# Patient Record
Sex: FEMALE | Race: WHITE
[De-identification: names, ages, dates, MRNs, and addresses within clinical notes are randomized per-mention and may not be internally consistent; named-entity substitution may affect disease eponyms.]

---

## 2021-04-11 ENCOUNTER — HOSPITAL ENCOUNTER (INPATIENT)
Dept: HOSPITAL 53 - M ED | Age: 21
LOS: 8 days | Discharge: HOME | DRG: 753 | End: 2021-04-19
Attending: PSYCHIATRY & NEUROLOGY | Admitting: PSYCHIATRY & NEUROLOGY
Payer: COMMERCIAL

## 2021-04-11 VITALS — WEIGHT: 123.46 LBS | BODY MASS INDEX: 21.6 KG/M2 | HEIGHT: 63.5 IN

## 2021-04-11 DIAGNOSIS — F31.81: Primary | ICD-10-CM

## 2021-04-11 DIAGNOSIS — Z56.89: ICD-10-CM

## 2021-04-11 DIAGNOSIS — Z63.5: ICD-10-CM

## 2021-04-11 DIAGNOSIS — F41.0: ICD-10-CM

## 2021-04-11 DIAGNOSIS — R45.851: ICD-10-CM

## 2021-04-11 DIAGNOSIS — F17.200: ICD-10-CM

## 2021-04-11 DIAGNOSIS — Z79.899: ICD-10-CM

## 2021-04-11 SDOH — SOCIAL STABILITY - SOCIAL INSECURITY: DISRUPTION OF FAMILY BY SEPARATION AND DIVORCE: Z63.5

## 2021-04-12 VITALS — DIASTOLIC BLOOD PRESSURE: 67 MMHG | SYSTOLIC BLOOD PRESSURE: 115 MMHG

## 2021-04-12 RX ADMIN — SERTRALINE HYDROCHLORIDE SCH MG: 50 TABLET ORAL at 21:58

## 2021-04-13 VITALS — DIASTOLIC BLOOD PRESSURE: 67 MMHG | SYSTOLIC BLOOD PRESSURE: 118 MMHG

## 2021-04-13 VITALS — SYSTOLIC BLOOD PRESSURE: 104 MMHG | DIASTOLIC BLOOD PRESSURE: 59 MMHG

## 2021-04-13 RX ADMIN — LAMOTRIGINE SCH MG: 25 TABLET ORAL at 20:40

## 2021-04-13 RX ADMIN — SERTRALINE HYDROCHLORIDE SCH MG: 50 TABLET ORAL at 20:40

## 2021-04-13 RX ADMIN — ACETAMINOPHEN PRN MG: 325 TABLET ORAL at 18:19

## 2021-04-13 NOTE — MHHPE
[facility] HISTORY AND PHYSICAL



DATE OF ADMISSION:  04/12/2021



IDENTIFYING DATA:  She is a 20-year-old  female, single, living by

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 Was admitted because

of suicidal thoughts. 



CHIEF COMPLAINT: "I was overwhelmed with work, and I had suicidal thoughts."



HISTORY OF PRESENT ILLNESS:  Two days ago patient was overwhelmed with work.

She had two panic attacks. Did not want to work. She went to her supervisor and

told her that she wanted to quit the job, and the patient's supervisor inquired

about her suicidal thoughts. She admitted to it, and she was brought to the

hospital. Complains of sleep disturbances, low energy, poor appetite,

hopelessness, helplessness. Though she had suicidal thoughts, she did not have

any plans. Patient has a history of hypomanic episodes. The symptoms were she

was feeling she was on the top of the world, she had racing thoughts, she was

into risk-taking behaviors, like driving fast, oversexuality, and when she is

hyper she goes on road trips for a long distance. Patient also has a history of

panic attacks without agoraphobia. Her current stressors are work. She split

with her boyfriend in January of this month. Her depression started from

January; however, it worsened recently, a few weeks ago. 



PAST PSYCHIATRIC HISTORY: Denied any psychiatric hospitalizations in the past,

but when she was 15 she wrote a suicide note, and she cut herself.



DRUG AND ALCOHOL HISTORY: Patient uses cannabis every day, and she also drinks

alcohol once a month. 



MEDICAL HISTORY:  Denies medical problems.



FAMILY HISTORY: Her father probably has bipolar disorder.  She was raised by

her father and stepmother. She has one brother and three sisters. She completed

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

[employer] for the last 1 year. 



MENTAL STATUS EXAMINATION: Thin built, cooperative. Made good eye contact. Mood

is depressed. Affect is constricted. Speech rate, rhythm, volume are good.

Thought process: Linear, goal directed. Thought content: Currently denies any

suicidal thoughts. Memory, immediate, remote, recent, is good. She is oriented

to time, place, and person. Impulse control is adequate. 



VITAL SIGNS: Temperature 98.4, respiratory rate is 14, pulse is 52, blood

pressure 104/59, pulse oximetry 99. 



CURRENT MEDICATIONS: Sertraline 50 mg once daily 



REVIEW OF SYSTEMS: 

CONSTITUTIONAL: Negative for night sweats, weight loss. Negative for epistaxis,

headache, hearing loss.

RESPIRATORY: No cough. No shortness of breath or wheezing.

CARDIOVASCULAR: Negative for chest pain or dyspnea or palpitations.

GASTROINTESTINAL: No abdominal pain. No change in bowel habits.

GENITOURINARY: No dysuria. No trouble voiding. No hematuria.

NEUROLOGIC: Negative for gait disturbances, numbness, tingling.



DIAGNOSIS:

Mood disorder, not otherwise specified, rule out bipolar 2 disorder.



PLAN: 

1. Admit patient to inpatient mental health unit (IM). 

2. Patient will be followed by internist for medical needs.

3. Patient will be seen by physical therapy (PT), occupational therapy (OT),

and . 

4. Patient will be placed on suicide precautions.

5. Patient will participate in appropriate activities, groups, individual

therapy.



MEDICATIONS: Patient currently is on Zoloft. I will continue 50 mg at night and

continue Lamictal 25 mg at night; titrate the dose upward. 



ESTIMATED LENGTH OF STAY: 4-5 days.



TIME SPENT: 1 hour.

## 2021-04-14 VITALS — DIASTOLIC BLOOD PRESSURE: 71 MMHG | SYSTOLIC BLOOD PRESSURE: 116 MMHG

## 2021-04-14 VITALS — SYSTOLIC BLOOD PRESSURE: 105 MMHG | DIASTOLIC BLOOD PRESSURE: 65 MMHG

## 2021-04-14 RX ADMIN — SERTRALINE HYDROCHLORIDE SCH MG: 50 TABLET ORAL at 20:48

## 2021-04-14 RX ADMIN — ACETAMINOPHEN PRN MG: 325 TABLET ORAL at 09:01

## 2021-04-14 RX ADMIN — LAMOTRIGINE SCH MG: 25 TABLET ORAL at 20:48

## 2021-04-14 NOTE — MHIPN
PROGRESS NOTE



DATE:  04/14/2021



SUBJECTIVE:  I am doing well, I am not depressed, only I had slight headache.  



OBJECTIVE: She is a 20-year-old  female, single living by herself,

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

suicidal thoughts.  The patient was stressed out at work. She told her

supervisor that she would like to quit.  When supervisor inquired further, she

expressed suicidal thoughts and she was brought to the hospital.  This is her

first psychiatric hospitalization.  The patient uses cannabis every day.  



The patient currently is doing better.  However, she has some mood swings. 



MENTAL STATUS EXAMINATION:  Thin built, cooperative, made good eye contact. 

Mood is depressed.  Affect is restricted.  Speech, rate, rhythm, volume are

good. Thought process linear, goal directed.  Denied auditory hallucination.

Denies suicidal or homicidal ideas.  Memory immediate, remote, recent are good.

She is oriented to time, place and person.  



VITAL SIGNS:  Temperature 97.7, pulse is 50, respiratory is 16, blood pressure

is 105/65, pox 99%.  



MEDICATIONS:  Lamictal 25 mg at night, Zoloft 50 mg in the a.m.  



ASSESSMENT AND PLAN:  The patient currently is doing better.  Denies any side

effects from the medications.  Plan is to continue current medications. 

Continue individual group and Milieu therapy.



Estimated length of stay 4 to 5 days.  



Time spent is 25 minutes.

## 2021-04-15 VITALS — SYSTOLIC BLOOD PRESSURE: 142 MMHG | DIASTOLIC BLOOD PRESSURE: 70 MMHG

## 2021-04-15 VITALS — SYSTOLIC BLOOD PRESSURE: 123 MMHG | DIASTOLIC BLOOD PRESSURE: 57 MMHG

## 2021-04-15 RX ADMIN — LAMOTRIGINE SCH MG: 25 TABLET ORAL at 20:47

## 2021-04-15 RX ADMIN — SERTRALINE HYDROCHLORIDE SCH MG: 25 TABLET ORAL at 20:47

## 2021-04-15 NOTE — MHIPNPDOC
[facility] Progress Note


Progress Note


DATE OF SERVICE: 4/15/21





SUBJECTIVE:  I am doing well, I am not depressed, only I had slight headache.  


"I could not sleep well"


OBJECTIVE: She is a 20-year-old  female, single living by herself,


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


suicidal thoughts.  The patient was stressed out at work. She told her


supervisor that she would like to quit.  When supervisor inquired further, she


expressed suicidal thoughts and she was brought to the hospital.  This is her


first psychiatric hospitalization.  The patient uses cannabis every day.  





The patient currently is doing better.  However, she has some mood swings. 


Still somewhat depressed





MENTAL STATUS EXAMINATION:  Thin built, cooperative, made good eye contact. 


Mood is depressed.  Affect is restricted.  Speech, rate, rhythm, volume are


good. Thought process linear, goal directed.  Denied auditory hallucination.


Denies suicidal or homicidal ideas.  Memory immediate, remote, recent are good.


She is oriented to time, place and person.  








 





MEDICATIONS:  Lamictal 25 mg at night, Zoloft 50 mg in the a.m.  





ASSESSMENT AND PLAN:  The patient currently is doing better.  Denies any side


effects from the medications.  Plan is to continue current medications. 


Continue individual group and Milieu therapy.


Increase zoloft 75 mg AM and trazodone 100 mg HS





Estimated length of stay 4 to 5 days.  





Time spent is 25 minutes.  








Vital Signs





Vital Signs








  Date Time  Temp Pulse Resp B/P (MAP) Pulse Ox O2 Delivery O2 Flow Rate FiO2


 


4/15/21 06:27 98.5 48 16 142/70 (94) 97 Room Air  











Current Medications





Current Medications








 Medications


  (Trade)  Dose


 Ordered  Sig/Genesis


 Route


 PRN Reason  Start Time


 Stop Time Status Last Admin


Dose Admin


 


 Acetaminophen


  (Tylenol Tab)  650 mg  Q6HP  PRN


 PO


 HEADACHE or DISCOMFORT  4/12/21 13:50


    4/14/21 09:01





 


 Home Med


  (Med Rec


 Complete!)    ASDIRECTED


 XX


   4/12/21 06:55


 4/12/21 06:54 DC  





 


 Lamotrigine


  (LaMICtal)  25 mg  QHS


 PO


   4/13/21 21:00


    4/14/21 20:48





 


 Sertraline HCl


  (Zoloft)  50 mg  QHS


 PO


   4/12/21 21:00


 4/15/21 10:33 DC 4/14/21 20:48





 


 Sertraline HCl


  (Zoloft)  75 mg  QHS


 PO


   4/15/21 21:00


     





 


 Trazodone HCl


  (Desyrel)  50 mg  QHSP  PRN


 PO


 INSOMNIA  4/12/21 13:50


 4/15/21 10:33 DC 4/14/21 22:44





 


 Trazodone HCl


  (Desyrel)  100 mg  QHSP  PRN


 PO


 INSOMNIA  4/15/21 10:30


     














Allergies


Coded Allergies:  


     No Known Allergies (Unverified , 4/11/21)











YOAN SALCIDO MD          Apr 15, 2021 12:22

## 2021-04-16 VITALS — DIASTOLIC BLOOD PRESSURE: 59 MMHG | SYSTOLIC BLOOD PRESSURE: 116 MMHG

## 2021-04-16 VITALS — DIASTOLIC BLOOD PRESSURE: 77 MMHG | SYSTOLIC BLOOD PRESSURE: 133 MMHG

## 2021-04-16 RX ADMIN — SERTRALINE HYDROCHLORIDE SCH MG: 25 TABLET ORAL at 20:49

## 2021-04-16 RX ADMIN — LAMOTRIGINE SCH MG: 25 TABLET ORAL at 20:49

## 2021-04-16 NOTE — MHIPNPDOC
[facility] Progress Note


Progress Note


DATE OF SERVICE: 4/16/21





SUBJECTIVE:  I am doing well, I am not depressed, only I had slight headache.  


"I could not sleep well"


OBJECTIVE: She is a 20-year-old  female, single living by herself,


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


suicidal thoughts.  The patient was stressed out at work. She told her


supervisor that she would like to quit.  When supervisor inquired further, she


expressed suicidal thoughts and she was brought to the hospital.  This is her


first psychiatric hospitalization.  The patient uses cannabis every day.  





The patient currently is doing better.  However, she has some mood swings. 


and doing better, brighter.





MENTAL STATUS EXAMINATION:  Thin built, cooperative, made good eye contact. 


Mood is depressed.  Affect is restricted.  Speech, rate, rhythm, volume are


good. Thought process linear, goal directed.  Denied auditory hallucination.


Denies suicidal or homicidal ideas.  Memory immediate, remote, recent are good.


She is oriented to time, place and person.  








 





MEDICATIONS:  Lamictal 25 mg at night, Zoloft 50 mg in the a.m.  





ASSESSMENT AND PLAN:  The patient currently is doing better.  Denies any side


effects from the medications.  Plan is to continue current medications. 


Continue individual group and Milieu therapy.


Increase Zoloft 75 mg AM and trazodone 100 mg HS


Increase Lamictal 25 mg bid


Estimated length of stay 4 to 5 days.  





Time spent is 25 minutes.  











Vital Signs





Vital Signs








  Date Time  Temp Pulse Resp B/P (MAP) Pulse Ox O2 Delivery O2 Flow Rate FiO2


 


4/16/21 06:31 97.7 55 16 116/59 (78) 98 Room Air  











Current Medications





Current Medications








 Medications


  (Trade)  Dose


 Ordered  Sig/Genesis


 Route


 PRN Reason  Start Time


 Stop Time Status Last Admin


Dose Admin


 


 Acetaminophen


  (Tylenol Tab)  650 mg  Q6HP  PRN


 PO


 HEADACHE or DISCOMFORT  4/12/21 13:50


    4/14/21 09:01





 


 Home Med


  (Med Rec


 Complete!)    ASDIRECTED


 XX


   4/12/21 06:55


 4/12/21 06:54 DC  





 


 Lamotrigine


  (LaMICtal)  25 mg  QHS


 PO


   4/13/21 21:00


    4/15/21 20:47





 


 Sertraline HCl


  (Zoloft)  50 mg  QHS


 PO


   4/12/21 21:00


 4/15/21 10:33 DC 4/14/21 20:48





 


 Sertraline HCl


  (Zoloft)  75 mg  QHS


 PO


   4/15/21 21:00


    4/15/21 20:47





 


 Trazodone HCl


  (Desyrel)  50 mg  QHSP  PRN


 PO


 INSOMNIA  4/12/21 13:50


 4/15/21 10:33 DC 4/14/21 22:44





 


 Trazodone HCl


  (Desyrel)  100 mg  QHSP  PRN


 PO


 INSOMNIA  4/15/21 10:30


    4/15/21 21:20














Allergies


Coded Allergies:  


     No Known Allergies (Unverified , 4/11/21)











YOAN SALCIDO MD          Apr 16, 2021 14:20

## 2021-04-17 VITALS — SYSTOLIC BLOOD PRESSURE: 118 MMHG | DIASTOLIC BLOOD PRESSURE: 65 MMHG

## 2021-04-17 VITALS — DIASTOLIC BLOOD PRESSURE: 59 MMHG | SYSTOLIC BLOOD PRESSURE: 105 MMHG

## 2021-04-17 VITALS — SYSTOLIC BLOOD PRESSURE: 105 MMHG | DIASTOLIC BLOOD PRESSURE: 59 MMHG

## 2021-04-17 RX ADMIN — LAMOTRIGINE SCH MG: 25 TABLET ORAL at 20:15

## 2021-04-17 RX ADMIN — ACETAMINOPHEN PRN MG: 325 TABLET ORAL at 21:59

## 2021-04-17 RX ADMIN — LAMOTRIGINE SCH MG: 25 TABLET ORAL at 09:28

## 2021-04-17 RX ADMIN — SERTRALINE HYDROCHLORIDE SCH MG: 25 TABLET ORAL at 20:15

## 2021-04-18 VITALS — SYSTOLIC BLOOD PRESSURE: 102 MMHG | DIASTOLIC BLOOD PRESSURE: 51 MMHG

## 2021-04-18 VITALS — DIASTOLIC BLOOD PRESSURE: 67 MMHG | SYSTOLIC BLOOD PRESSURE: 136 MMHG

## 2021-04-18 RX ADMIN — LAMOTRIGINE SCH MG: 25 TABLET ORAL at 08:24

## 2021-04-18 RX ADMIN — SERTRALINE HYDROCHLORIDE SCH MG: 25 TABLET ORAL at 20:19

## 2021-04-18 RX ADMIN — LAMOTRIGINE SCH MG: 25 TABLET ORAL at 20:19

## 2021-04-19 VITALS — DIASTOLIC BLOOD PRESSURE: 53 MMHG | SYSTOLIC BLOOD PRESSURE: 139 MMHG

## 2021-04-19 RX ADMIN — LAMOTRIGINE SCH MG: 25 TABLET ORAL at 08:28

## 2021-04-19 NOTE — MHDS
[facility] DISCHARGE SUMMARY



DATE OF ADMISSION:  04/12/2021

DATE OF DISCHARGE:  04/19/2021



DIAGNOSIS:  Mood disorder not otherwise specified, rule out bipolar 2 disorder.



IDENTIFYING DATA: She is a 20-year-old  female, single, living by

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

admitted because of suicidal thoughts.  She was stressed out at work.  



For details of HPI, past psychiatric history, substance abuse history, medical

history, social history, please refer to the initial evaluation. 



COURSE IN THE HOSPITAL:  The patient initially was depressed. Her affect was

labile. Complained of decreased sleep, poor appetite.  She was placed on

Lamictal and Zoloft.  Her medication was titrated upwards. She made gradual

recovery.  Her depression resolved.  She started sleeping better.  She attended

groups.  She attended activities.  Denied any suicidal thoughts.  Denied any

side effect of the medications. 



MENTAL STATUS EXAMINATION:

Neatly dressed, well-groomed, cooperative, made good eye contact. Speech rate,

rhythm and volume are good.  Thought process linear, goal directed.  Thought

content: Denied any suicidal and homicidal ideas.  Denied any hallucinations,

auditory or visual.  Her insight and judgment are good.  Her memory immediate,

remote and recent are good.



VITAL SIGNS:  Temperature 98.4, respirations 16, pulse 65, blood pressure

139/53, pulse oximetry 96.



PLAN: Plan is to send her home.  She will be staying with her grandmother

initially.  She wants to quit her job and she will be followed up at [facility].



DISCHARGE MEDICATIONS:   

1.  Lamictal 25 mg twice a day. 

2.  Zoloft 25 mg in the a.m. 



Time spent is less than 30 minutes.

## 2023-12-28 NOTE — HPEPDOC
General


Date of Admission


Apr 12, 2021 at 13:49


Date of Service:  Apr 13, 2021


Chief Complaint


The patient is a 20-year-old female admitted with a reason for visit of 

Unspecified Depressive Disorder.


Source:  Patient


Exam Limitations:  No limitations





History of Present Illness


Patient is 20 years female with past medical history of anxiety presented 

hospital with suicidal thoughts. Two days ago patient was overwhelmed with work.

She had two panic attacks. Did not want to work. She went to her supervisor and 

told her that she wanted to quit the job, and the patient's supervisor inquired 

about her suicidal thoughts. During my interview patient denied fever, chills or

nausea, vomiting, chest pain, palpitations diarrhea or dysuria





Home Medications


Scheduled


Sertraline HCl (Sertraline HCl) 50 Mg Tablet, 50 MG PO QHS, (Reported)





Allergies


Coded Allergies:  


     No Known Allergies (Unverified , 4/11/21)





Past Medical History


Medical History


Anxiety





Family History


Father has bipolar disorder





Social History


* Smoker:  current smoker


Alcohol:  Denies


Drugs:  marijuana





A-FIB/CHADSVASC


A-FIB History


Current/History of A-Fib/PAF?:  No


Current PO Anticoag Therapy:  No





Review of Systems


Constitutional:  Denies: Chills, Fever


Eyes:  Denies: Pain


ENT:  Denies: Head Aches


Pulmonary:  Denies: Dyspnea


Cardiovascular:  Denies: Chest Pain


Gastrointestinal:  Denies: Nausea, Vomiting


Genitourinary:  Denies: Dysuria


Hematologic:  Denies: Bruising


Endocrine:  Denies: Polydipsia


Musculoskeletal:  Denies: Neck Pain


Neurological:  Denies: Weakness


Psych:  Reports: Anxiety; 


   Denies: Mood Normal





Physical Examination


General Exam:  Positive: Alert, Cooperative


Eye Exam:  Positive: PERRLA


ENT Exam:  Positive: Atraumatic


Neck Exam:  Positive: Supple; 


   Negative: JVD


Chest Exam:  Positive: Clear to auscultation


Heart Exam:  Positive: Rate Normal


Telemetry:  Positive: No significant arrhythmia


Abdomen Exam:  Positive: Normal bowel sounds


Extremity Exam:  Negative: Clubbing, Cyanosis


Skin Exam:  Positive: Nl turgor and temperature


Neuro Exam:  Positive: Normal Gait


Psych Exam:  Positive: Oriented x 3





Vital Signs





Vital Signs








  Date Time  Temp Pulse Resp B/P (MAP) Pulse Ox O2 Delivery O2 Flow Rate FiO2


 


4/13/21 16:25 96.8 68 18 118/67 (84) 98 Room Air  











 Assessment/Plan


Patient is 20 years female with past medical history of anxiety presented 

hospital with suicidal thoughts. Two days ago patient was overwhelmed with work.

She had two panic attacks. Did not want to work. She went to her supervisor and 

told her that she wanted to quit the job, and the patient's supervisor inquired 

about her suicidal thoughts. During my interview patient denied fever, chills or

nausea, vomiting, chest pain, palpitations diarrhea or dysuria





Problems





(1) Suicidal ideation


Status:  Acute


Problem Text:  defer treatment to psych team








Plan / VTE


VTE Prophylaxis Ordered?:  No


VTE Exclusion Mechanical Proph:  Low Risk for VTE











TINY MISHRA DO            Apr 13, 2021 19:06 English